# Patient Record
Sex: FEMALE | URBAN - METROPOLITAN AREA
[De-identification: names, ages, dates, MRNs, and addresses within clinical notes are randomized per-mention and may not be internally consistent; named-entity substitution may affect disease eponyms.]

---

## 2023-05-31 ENCOUNTER — APPOINTMENT (RX ONLY)
Dept: URBAN - METROPOLITAN AREA CLINIC 166 | Facility: CLINIC | Age: 63
Setting detail: DERMATOLOGY
End: 2023-05-31

## 2023-05-31 DIAGNOSIS — L82.1 OTHER SEBORRHEIC KERATOSIS: ICD-10-CM

## 2023-05-31 DIAGNOSIS — L88 PYODERMA GANGRENOSUM: ICD-10-CM

## 2023-05-31 DIAGNOSIS — L259 CONTACT DERMATITIS AND OTHER ECZEMA, UNSPECIFIED CAUSE: ICD-10-CM

## 2023-05-31 DIAGNOSIS — B35.1 TINEA UNGUIUM: ICD-10-CM

## 2023-05-31 PROBLEM — L30.8 OTHER SPECIFIED DERMATITIS: Status: ACTIVE | Noted: 2023-05-31

## 2023-05-31 PROBLEM — L30.9 DERMATITIS, UNSPECIFIED: Status: ACTIVE | Noted: 2023-05-31

## 2023-05-31 PROBLEM — D23.71 OTHER BENIGN NEOPLASM OF SKIN OF RIGHT LOWER LIMB, INCLUDING HIP: Status: ACTIVE | Noted: 2023-05-31

## 2023-05-31 PROCEDURE — ? COUNSELING

## 2023-05-31 PROCEDURE — 99204 OFFICE O/P NEW MOD 45 MIN: CPT

## 2023-05-31 PROCEDURE — ? TREATMENT REGIMEN

## 2023-05-31 PROCEDURE — ? RECORDS REQUESTED

## 2023-05-31 PROCEDURE — ? PRESCRIPTION

## 2023-05-31 RX ORDER — PREDNISONE 10 MG/1
TABLET ORAL
Qty: 150 | Refills: 0 | Status: ERX | COMMUNITY
Start: 2023-05-31

## 2023-05-31 RX ORDER — BETAMETHASONE DIPROPIONATE 0.5 MG/G
OINTMENT TOPICAL
Qty: 45 | Refills: 2 | Status: ERX | COMMUNITY
Start: 2023-05-31

## 2023-05-31 RX ORDER — TRIAMCINOLONE ACETONIDE 1 MG/G
CREAM TOPICAL BID
Qty: 454 | Refills: 1 | Status: ERX | COMMUNITY
Start: 2023-05-31

## 2023-05-31 RX ADMIN — BETAMETHASONE DIPROPIONATE: 0.5 OINTMENT TOPICAL at 00:00

## 2023-05-31 RX ADMIN — TRIAMCINOLONE ACETONIDE: 1 CREAM TOPICAL at 00:00

## 2023-05-31 RX ADMIN — PREDNISONE: 10 TABLET ORAL at 00:00

## 2023-05-31 ASSESSMENT — LOCATION SIMPLE DESCRIPTION DERM
LOCATION SIMPLE: LEFT GREAT TOE
LOCATION SIMPLE: LEFT UPPER ARM
LOCATION SIMPLE: RIGHT UPPER ARM
LOCATION SIMPLE: RIGHT GREAT TOE
LOCATION SIMPLE: LEFT ACHILLES SKIN

## 2023-05-31 ASSESSMENT — LOCATION DETAILED DESCRIPTION DERM
LOCATION DETAILED: LEFT ANTECUBITAL SKIN
LOCATION DETAILED: LEFT ANTERIOR PROXIMAL UPPER ARM
LOCATION DETAILED: RIGHT ANTECUBITAL SKIN
LOCATION DETAILED: RIGHT GREAT TOENAIL
LOCATION DETAILED: LEFT ACHILLES SKIN
LOCATION DETAILED: LEFT GREAT TOENAIL

## 2023-05-31 ASSESSMENT — LOCATION ZONE DERM
LOCATION ZONE: ARM
LOCATION ZONE: TOENAIL
LOCATION ZONE: LEG

## 2023-05-31 NOTE — PROCEDURE: TREATMENT REGIMEN
Plan: **\\nA/P:\\n\\n3- year history of ulcer on ankle, persistent and progressive over time. Managed currently and regularly by wound care clinic. \\nShe has also gone through various vein treatments to increase perfusion, wound treatments including wound vac, surgical debridement, etc, and has been under care of other doctors (briefly, a dermatologist, general surgeon, various vascular surgeons) since then. \\nPt believes current ulcer onset was related to Covid infection in 8/2020– she reports h/o diffuse rash at that time that has since resolved but ulcer persistent/progressive. \\nPreviously biopsied with dermatologist , unsure of exact bx results - request records for my review .\\n\\nShe reports h/o multiple secondary wound infections, many cultures that were positive for various infectious organisms, requiring multiple courses of antibiotics . She was most recently on Doxycycline for wound infection but d/c due to pancreatitis and other SE attributed to Doxy. \\n\\nNote- pt has chornic h/o severe venous insufficiency with h/o multiple smaller venous ulcers that resolved with wound care and management with vascular surgeons. \\n\\n***Suspected diagnosis of Pyoderma Gangrenosum based on morphology and history. +component of Venous stasis ulcer related to pt’s history of chronic, severe venous insufficiency (and other similar ulcers over years).  However, discussed that PG is a diagnosis of exclusion.\\n\\n- Cont f/u with wound clinic weekly as scheduled. Reminded pt that DEBRIDEMENT SHOULD BE AVOIDED if possible, because PG demonstrates pathergy, a phenomenon by which any injury to skin can.induce worsening disease or lead to new lesions. (Note - I suspect that pt’s previous surgical debridement led to worsening of ulceration based on photo review before/after her last debridement). I Also advised her to d/c all at-home exfoliating cleansers/debridement with wound care. \\n- recommend Wound care with addition of topical steroid to active ulcer borders per above . **wound care should include NON-STICK gauze only with +++ointment at base to prevent trauma/injury to ulcer when removing dry gauze \\n- Consider Prednisone taper per Rx above, as this is the first line treatment for active PG. however, recommend she discuss with her doctors prior to starting and at earliest, consider starting AFTER upcoming vein surgery, assuming okay with surgeon. \\n- Request medical records from previous derm (including bx report), wound clinic (culture results, etc), vascular surgeon \\n- Will strongly consider repeat punch biopsy of ulcer edge at 1-week f/u visit pending response to above and review of previous results \\nAlso plan for 2nd biopsy at that time to send for tissue culture to rule out infectious etiology (although this wound most likely be a secondary infection).\\n- Reminded pt to continue leg elevation (knee above hip, ankle above knee) to minimize LE swelling and improve perfusion . Also hope that upcoming vein surgery with increase perfusion to wound , promote healing \\n\\n- cont to monitor closely for secondary infection. Recommend regular consideration of wound cultures to r/o infection, especially if new lesions\\nand/or if old lesions are draining,weeping, worsening . Consider chronic prophylactic abx.\\n- Consider workup to rule out other underlying systemic disease associated with PG pending response to above and my review of her previous workup (CBC, SPEP/immunofixation, CMP, U/A, ANCA, SELAM, RF, AntiPL ab, etc).
Detail Level: Zone
Discontinue Regimen: Surgical debridement\\nSal acid or any exfoliating cleansers, scrubs, etc
Initiate Treatment: ** Recommend wound care, repeat 1-2x/day for 1-2 weeks, until follow up : \\n1. Vinegar soaks (diluted, handout with recipe provided), then clean gently with antibacterial soap, rinse thoroughly, dry completely\\n2.Apply thick layer of Aquaphor ointment to wound base\\n3. Apply betamethasone dipropionate 0.05 % topical ointment to affected areas of wound edges \\n4. Cover with nonstick gauze \\n5. wrap gently with Kerlix gauze or ace bandage, Coban wrap \\n\\n** Plan to start Prednisone taper ( 10 mg tablet ) pending approval with her doctors and after vein surgery if okay with surgeon:\\nTake 6 tabs daily x 1 week, then 5 tabs daily x1 week, then 4 tabs daily x1 week, then 3 tabs daily x1 week, then 2 tabs daily x1 week, then 1 tab daily until further instructions provided pending response
Plan: **\\nInitial dermatitis was diffuse, onset around time of covid infection in 8/2020. Possible she had a dermal hypersensitivity reaction or other dermatitis at that time based on this history. 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 \\nPt reports rash is Mild now compared to previous flares.\\n\\nRestart trial topical steroids per above to any active areas PRN\\nAlso stressed importance of moisturizers (Cerave or Vanicream CREAM bid) and good daily skin care for barrier repair to prevent future flares. \\nClose f/u in 1-2 weeks and regularly after that. \\n\\nPay attention to and avoid triggers, allergens, irritants. \\nConsider further work up including biopsy or patch testing in future pending response
Otc Regimen: Fragrance free products only (Vanicream, cerave, cetaphil products idea)
Initiate Treatment: triamcinolone acetonide 0.1 % topical cream BID\\nApply to affected area of body twice daily as needed for mild to moderate rash for up to two weeks per month max prn flares. Never use on face, groin.
Otc Regimen: Vinegar soaks daily

## 2023-05-31 NOTE — HPI: WOUND
How Severe Is It?: severe
How Is Your Wound Healing?: healing slowly
Is This A New Presentation, Or A Follow-Up?: Wound
Additional History: Has apt for angioplasty tomorrow. States wound started from rash initially from COVID in 8/2020. Has been going to wound clinic once weekly, has had many cultures showing multiple bacterial infections. Was put on 2 courses of Doxy which caused her to have pancreatitis + wound vac in the ER.

## 2023-05-31 NOTE — PROCEDURE: RECORDS REQUESTED
Detail Level: Simple
Preface: I requested the records from the following providers.
Providers To Request Records From: Dr. Jake Zaman at [facility]

## 2023-06-15 ENCOUNTER — APPOINTMENT (RX ONLY)
Dept: URBAN - METROPOLITAN AREA CLINIC 166 | Facility: CLINIC | Age: 63
Setting detail: DERMATOLOGY
End: 2023-06-15

## 2023-06-15 DIAGNOSIS — L88 PYODERMA GANGRENOSUM: ICD-10-CM

## 2023-06-15 PROBLEM — L30.9 DERMATITIS, UNSPECIFIED: Status: ACTIVE | Noted: 2023-06-15

## 2023-06-15 PROCEDURE — 99214 OFFICE O/P EST MOD 30 MIN: CPT

## 2023-06-15 PROCEDURE — ? PHOTO-DOCUMENTATION

## 2023-06-15 PROCEDURE — ? ORDER TESTS

## 2023-06-15 PROCEDURE — ? TREATMENT REGIMEN

## 2023-06-15 PROCEDURE — ? COUNSELING

## 2023-06-15 ASSESSMENT — LOCATION DETAILED DESCRIPTION DERM: LOCATION DETAILED: LEFT ACHILLES SKIN

## 2023-06-15 ASSESSMENT — LOCATION SIMPLE DESCRIPTION DERM: LOCATION SIMPLE: LEFT ACHILLES SKIN

## 2023-06-15 ASSESSMENT — LOCATION ZONE DERM: LOCATION ZONE: LEG

## 2023-06-15 NOTE — PROCEDURE: TREATMENT REGIMEN
Plan: **\\nA/P:\\n\\n3- year history of ulcer on ankle, persistent and progressive over time. Managed currently and regularly by wound care clinic. \\nShe has also gone through various vein treatments to increase perfusion, wound treatments including wound vac, surgical debridement, etc, and has been under care of other doctors (briefly, a dermatologist, general surgeon, various vascular surgeons) since then. \\nPt believes current ulcer onset was related to Covid infection in 8/2020– she reports h/o diffuse rash at that time that has since resolved but ulcer persistent/progressive. \\nPreviously biopsied with dermatologist , unsure of exact bx results - requested records for my review .\\n\\nShe reports h/o multiple secondary wound infections, many cultures that were positive for various infectious organisms, requiring multiple courses of antibiotics . She was most recently on Doxycycline for wound infection but d/c due to pancreatitis and other SE attributed to Doxy. \\n\\nNote- pt has chronic h/o severe venous insufficiency with h/o multiple smaller venous ulcers that resolved with wound care and management with vascular surgeons. \\n\\n*Suspected diagnosis of Pyoderma Gangrenosum based on morphology and history. +component of Venous stasis ulcer related to pt’s history of chronic, severe venous insufficiency (and other similar ulcers over years).  However, discussed that PG is a diagnosis of exclusion.\\nStable, possibly some improvement, since last visit. Pt has not proceeded with any of below recs from last visit based on recs from her vascular surgeon due to recent surgery. \\n\\nRECS:\\n\\n- Cont f/u with wound clinic weekly as scheduled. Reminded pt that DEBRIDEMENT SHOULD BE AVOIDED if possible, because PG demonstrates pathergy, a phenomenon by which any injury to skin can.induce worsening disease or lead to new lesions. (Note - I suspect that pt’s previous surgical debridement led to worsening of ulceration based on photo review before/after her last debridement). I Also advised her to d/c all at-home exfoliating cleansers/debridement with wound care. \\n\\n- recommend Wound care with addition of topical steroid to active ulcer borders per above . **wound care should include NON-STICK gauze only with +++ointment at base to prevent trauma/injury to ulcer when removing dry gauze \\n\\n- Consider Prednisone taper (see previous note for rx), as this is the first line treatment for active PG. however, recommend she discuss with her doctors prior to starting and at earliest, consider starting AFTER upcoming vein surgery, assuming okay with surgeon. \\n*****NOTE: Pt reports her surgeon and doctors recommend against this treatment for now, will call our office when/if she decides to proceed with this recommendation  \\n\\n- Requested medical records from previous derm (including bx report), wound clinic (culture results, etc), vascular surgeon \\n\\n- Will strongly consider repeat punch biopsy of ulcer edge (+2nd bx for tissue culture to r/o less likely infectious etiology) at f/u visit pending response to above and review of previous results . Pt Declines for now due to recent surgery. \\n\\n- Reminded pt to continue leg elevation (knee above hip, ankle above knee) to minimize LE swelling and improve perfusion . Also hope that recent vein surgery with increase perfusion to wound , promote healing \\n\\n- cont to monitor closely for secondary infection. Recommend regular consideration of wound cultures to r/o infection, especially if new lesions\\nand/or if old lesions are draining,weeping, worsening . Consider chronic prophylactic abx.\\n\\n- recommend  workup to rule out other underlying systemic disease associated with PG pending response to above and my review of her previous workup (CBC, SPEP/immunofixation, CMP, U/A, ANCA, SELAM, RF, AntiPL ab, etc). Lab order provided  today
Detail Level: Zone
Discontinue Regimen: Surgical debridement\\nSal acid or any exfoliating cleansers, scrubs, etc
Initiate Treatment: ** Recommend wound care, repeat 1-2x/day for 1-2 weeks, until follow up if okay with vascular surgeon: \\n1. Vinegar soaks (diluted, handout with recipe provided), then clean gently with antibacterial soap, rinse thoroughly, dry completely\\n2.Apply thick layer of Aquaphor ointment to wound base if tolerated\\n3. Apply betamethasone dipropionate 0.05 % or clobetasol topical ointment to affected areas of wound edges \\n4. Cover with nonstick gauze \\n5. wrap gently with Kerlix gauze or ace bandage, Coban wrap

## 2023-06-15 NOTE — PROCEDURE: ORDER TESTS
Performing Laboratory: [id number]
Expected Date Of Service: 06/15/2023
Bill For Surgical Tray: no
Billing Type: Third-Party Bill

## 2023-09-25 ENCOUNTER — APPOINTMENT (RX ONLY)
Dept: URBAN - METROPOLITAN AREA CLINIC 170 | Facility: CLINIC | Age: 63
Setting detail: DERMATOLOGY
End: 2023-09-25

## 2023-09-25 DIAGNOSIS — L21.8 OTHER SEBORRHEIC DERMATITIS: ICD-10-CM

## 2023-09-25 PROCEDURE — ? PRESCRIPTION

## 2023-09-25 PROCEDURE — ? TREATMENT REGIMEN

## 2023-09-25 PROCEDURE — 99214 OFFICE O/P EST MOD 30 MIN: CPT

## 2023-09-25 PROCEDURE — ? COUNSELING

## 2023-09-25 RX ORDER — KETOCONAZOLE 20 MG/G
CREAM TOPICAL
Qty: 30 | Refills: 11 | Status: ERX | COMMUNITY
Start: 2023-09-25

## 2023-09-25 RX ORDER — CLOBETASOL PROPIONATE 0.5 MG/ML
SOLUTION TOPICAL
Qty: 50 | Refills: 0 | Status: ERX | COMMUNITY
Start: 2023-09-25

## 2023-09-25 RX ORDER — KETOCONAZOLE 20 MG/ML
SHAMPOO, SUSPENSION TOPICAL
Qty: 120 | Refills: 11 | Status: ERX | COMMUNITY
Start: 2023-09-25

## 2023-09-25 RX ORDER — HYDROCORTISONE 25 MG/G
CREAM TOPICAL
Qty: 30 | Refills: 0 | Status: ERX | COMMUNITY
Start: 2023-09-25

## 2023-09-25 RX ADMIN — CLOBETASOL PROPIONATE: 0.5 SOLUTION TOPICAL at 00:00

## 2023-09-25 RX ADMIN — KETOCONAZOLE: 20 CREAM TOPICAL at 00:00

## 2023-09-25 RX ADMIN — HYDROCORTISONE: 25 CREAM TOPICAL at 00:00

## 2023-09-25 RX ADMIN — KETOCONAZOLE: 20 SHAMPOO, SUSPENSION TOPICAL at 00:00

## 2023-09-25 ASSESSMENT — LOCATION SIMPLE DESCRIPTION DERM
LOCATION SIMPLE: LEFT SCALP
LOCATION SIMPLE: LEFT CHEEK

## 2023-09-25 ASSESSMENT — LOCATION ZONE DERM
LOCATION ZONE: FACE
LOCATION ZONE: SCALP

## 2023-09-25 ASSESSMENT — LOCATION DETAILED DESCRIPTION DERM
LOCATION DETAILED: LEFT CENTRAL MALAR CHEEK
LOCATION DETAILED: LEFT CENTRAL FRONTAL SCALP

## 2023-09-25 NOTE — HPI: ITCHING
What Type Of Note Output Would You Prefer (Optional)?: Standard Output
Additional History: hospitalized twice for pancreatitis from antibiotics from ulcer on ankle. Was not bathed for 10 days each time and itching was out of control. Worse on scalp, eyebrows, gets flaky.

## 2023-09-25 NOTE — PROCEDURE: TREATMENT REGIMEN
Detail Level: Zone
Initiate Treatment: Ketoconazole Shampoo- Apply to scalp 3-5 times a week, Let sit for 5 minutes then rinse off well. \\nClobetasol Scalp Solution- Apply to scalp twice daily x2 week intervals for flares
Discontinue Regimen: Olay pore perfecting scrub
Initiate Treatment: ketoconazole 2 % topical cream Apply to affected area(s) of face 2-5x per week as needed\\n\\nhydrocortisone 2.5 % topical cream Apply to affected areas on face twice daily up to 2 weeks at a time.
Detail Level: Simple